# Patient Record
Sex: FEMALE | Race: OTHER | HISPANIC OR LATINO | URBAN - METROPOLITAN AREA
[De-identification: names, ages, dates, MRNs, and addresses within clinical notes are randomized per-mention and may not be internally consistent; named-entity substitution may affect disease eponyms.]

---

## 2023-07-09 ENCOUNTER — EMERGENCY (EMERGENCY)
Facility: HOSPITAL | Age: 28
LOS: 1 days | Discharge: HOME | End: 2023-07-09
Attending: EMERGENCY MEDICINE | Admitting: EMERGENCY MEDICINE
Payer: COMMERCIAL

## 2023-07-09 VITALS
TEMPERATURE: 98 F | HEIGHT: 66 IN | DIASTOLIC BLOOD PRESSURE: 91 MMHG | SYSTOLIC BLOOD PRESSURE: 149 MMHG | RESPIRATION RATE: 17 BRPM | HEART RATE: 93 BPM | OXYGEN SATURATION: 97 % | WEIGHT: 139.99 LBS

## 2023-07-09 VITALS
TEMPERATURE: 99 F | DIASTOLIC BLOOD PRESSURE: 90 MMHG | SYSTOLIC BLOOD PRESSURE: 146 MMHG | OXYGEN SATURATION: 98 % | HEART RATE: 90 BPM | RESPIRATION RATE: 18 BRPM

## 2023-07-09 DIAGNOSIS — J03.90 ACUTE TONSILLITIS, UNSPECIFIED: ICD-10-CM

## 2023-07-09 DIAGNOSIS — B95.5 UNSPECIFIED STREPTOCOCCUS AS THE CAUSE OF DISEASES CLASSIFIED ELSEWHERE: ICD-10-CM

## 2023-07-09 DIAGNOSIS — J02.9 ACUTE PHARYNGITIS, UNSPECIFIED: ICD-10-CM

## 2023-07-09 LAB
ANION GAP SERPL CALC-SCNC: 12 MMOL/L — SIGNIFICANT CHANGE UP (ref 5–17)
BASOPHILS # BLD AUTO: 0.03 K/UL — SIGNIFICANT CHANGE UP (ref 0–0.2)
BASOPHILS NFR BLD AUTO: 0.2 % — SIGNIFICANT CHANGE UP (ref 0–2)
BUN SERPL-MCNC: 8 MG/DL — SIGNIFICANT CHANGE UP (ref 7–23)
CALCIUM SERPL-MCNC: 8.7 MG/DL — SIGNIFICANT CHANGE UP (ref 8.4–10.5)
CHLORIDE SERPL-SCNC: 102 MMOL/L — SIGNIFICANT CHANGE UP (ref 96–108)
CO2 SERPL-SCNC: 24 MMOL/L — SIGNIFICANT CHANGE UP (ref 22–31)
CREAT SERPL-MCNC: 0.76 MG/DL — SIGNIFICANT CHANGE UP (ref 0.5–1.3)
EGFR: 109 ML/MIN/1.73M2 — SIGNIFICANT CHANGE UP
EOSINOPHIL # BLD AUTO: 0.06 K/UL — SIGNIFICANT CHANGE UP (ref 0–0.5)
EOSINOPHIL NFR BLD AUTO: 0.5 % — SIGNIFICANT CHANGE UP (ref 0–6)
GLUCOSE SERPL-MCNC: 84 MG/DL — SIGNIFICANT CHANGE UP (ref 70–99)
HCT VFR BLD CALC: 39.5 % — SIGNIFICANT CHANGE UP (ref 34.5–45)
HGB BLD-MCNC: 12.9 G/DL — SIGNIFICANT CHANGE UP (ref 11.5–15.5)
IMM GRANULOCYTES NFR BLD AUTO: 0.3 % — SIGNIFICANT CHANGE UP (ref 0–0.9)
LYMPHOCYTES # BLD AUTO: 1.06 K/UL — SIGNIFICANT CHANGE UP (ref 1–3.3)
LYMPHOCYTES # BLD AUTO: 8.3 % — LOW (ref 13–44)
MCHC RBC-ENTMCNC: 31.3 PG — SIGNIFICANT CHANGE UP (ref 27–34)
MCHC RBC-ENTMCNC: 32.7 GM/DL — SIGNIFICANT CHANGE UP (ref 32–36)
MCV RBC AUTO: 95.9 FL — SIGNIFICANT CHANGE UP (ref 80–100)
MONOCYTES # BLD AUTO: 0.95 K/UL — HIGH (ref 0–0.9)
MONOCYTES NFR BLD AUTO: 7.4 % — SIGNIFICANT CHANGE UP (ref 2–14)
NEUTROPHILS # BLD AUTO: 10.67 K/UL — HIGH (ref 1.8–7.4)
NEUTROPHILS NFR BLD AUTO: 83.3 % — HIGH (ref 43–77)
NRBC # BLD: 0 /100 WBCS — SIGNIFICANT CHANGE UP (ref 0–0)
PLATELET # BLD AUTO: 270 K/UL — SIGNIFICANT CHANGE UP (ref 150–400)
POTASSIUM SERPL-MCNC: 4 MMOL/L — SIGNIFICANT CHANGE UP (ref 3.5–5.3)
POTASSIUM SERPL-SCNC: 4 MMOL/L — SIGNIFICANT CHANGE UP (ref 3.5–5.3)
RBC # BLD: 4.12 M/UL — SIGNIFICANT CHANGE UP (ref 3.8–5.2)
RBC # FLD: 12.9 % — SIGNIFICANT CHANGE UP (ref 10.3–14.5)
SODIUM SERPL-SCNC: 138 MMOL/L — SIGNIFICANT CHANGE UP (ref 135–145)
WBC # BLD: 12.81 K/UL — HIGH (ref 3.8–10.5)
WBC # FLD AUTO: 12.81 K/UL — HIGH (ref 3.8–10.5)

## 2023-07-09 PROCEDURE — 99284 EMERGENCY DEPT VISIT MOD MDM: CPT

## 2023-07-09 PROCEDURE — 85025 COMPLETE CBC W/AUTO DIFF WBC: CPT

## 2023-07-09 PROCEDURE — 96365 THER/PROPH/DIAG IV INF INIT: CPT

## 2023-07-09 PROCEDURE — 96375 TX/PRO/DX INJ NEW DRUG ADDON: CPT

## 2023-07-09 PROCEDURE — 80048 BASIC METABOLIC PNL TOTAL CA: CPT

## 2023-07-09 PROCEDURE — 36415 COLL VENOUS BLD VENIPUNCTURE: CPT

## 2023-07-09 PROCEDURE — 99284 EMERGENCY DEPT VISIT MOD MDM: CPT | Mod: 25

## 2023-07-09 RX ORDER — IBUPROFEN 200 MG
1 TABLET ORAL
Qty: 21 | Refills: 0
Start: 2023-07-09 | End: 2023-07-15

## 2023-07-09 RX ORDER — KETOROLAC TROMETHAMINE 30 MG/ML
15 SYRINGE (ML) INJECTION ONCE
Refills: 0 | Status: DISCONTINUED | OUTPATIENT
Start: 2023-07-09 | End: 2023-07-09

## 2023-07-09 RX ORDER — AMPICILLIN SODIUM AND SULBACTAM SODIUM 250; 125 MG/ML; MG/ML
3 INJECTION, POWDER, FOR SUSPENSION INTRAMUSCULAR; INTRAVENOUS ONCE
Refills: 0 | Status: COMPLETED | OUTPATIENT
Start: 2023-07-09 | End: 2023-07-09

## 2023-07-09 RX ORDER — TETRACAINE/BENZOCAINE/BUTAMBEN 2%-14%-2%
1 OINTMENT (GRAM) TOPICAL ONCE
Refills: 0 | Status: DISCONTINUED | OUTPATIENT
Start: 2023-07-09 | End: 2023-07-13

## 2023-07-09 RX ORDER — DEXAMETHASONE 0.5 MG/5ML
10 ELIXIR ORAL ONCE
Refills: 0 | Status: COMPLETED | OUTPATIENT
Start: 2023-07-09 | End: 2023-07-09

## 2023-07-09 RX ADMIN — AMPICILLIN SODIUM AND SULBACTAM SODIUM 200 GRAM(S): 250; 125 INJECTION, POWDER, FOR SUSPENSION INTRAMUSCULAR; INTRAVENOUS at 13:36

## 2023-07-09 RX ADMIN — Medication 102 MILLIGRAM(S): at 13:36

## 2023-07-09 RX ADMIN — Medication 15 MILLIGRAM(S): at 13:59

## 2023-07-09 RX ADMIN — Medication 10 MILLIGRAM(S): at 14:28

## 2023-07-09 NOTE — ED ADULT TRIAGE NOTE - CHIEF COMPLAINT QUOTE
Pt sent by  for peritonsilar abscess, + strep x Friday. Pt started zpak friday without relief. Denies fevers, sob. No pmh

## 2023-07-09 NOTE — CONSULT NOTE ADULT - SUBJECTIVE AND OBJECTIVE BOX
OTOLARYNGOLOGY (ENT) CONSULTATION NOTE    PATIENT: CAM COTE     MRN: [id number]       : 89  DATE OF ADMISSION:23  DATE OF SERVICE:  23 @ 15:16    CHIEF COMPLAINT: throat pain     HISTORY OF PRESENT ILLNESS:  CAM COTE  is a 28y Female with no significant PMHx who presents with complaints of 4 days of throat pain, bilateral, right worse than left. Has been able to tolerate oral intake. Notices snoring that started at night when pain started. Saw a provider who started z-pack that she has taken for 2 days without improvement. Went back to urgent care today and rapid strep test performed that was positive; transferred to [facility] for possible PTA.     Has history of strep infections as child but not as adult. Denies any fevers. Has been taking ibuprofen for pain. No significant voice change or drooling.     PAST MEDICAL HISTORY:    CURRENT MEDICATIONS   tetracaine/benzocaine/butamben Spray 1 Topical PRN      HOME MEDICATIONS:      ALLERGIES:  No Known Allergies    SOCIAL HISTORY: Pertinent included in HPI   FAMILY HISTORY      SURGICAL HISTORY:      REVIEW OF SYSTEMS: The patient was asked and responded to a review of systems regarding the following symptoms: constitutional, eye, ears, nose, mouth, throat, cardiovascular, respiratory. Pertinent factors have been included in the HPI.     PHYSICAL EXAMINATION:  General: NAD, A+Ox3  Respiratory: No respiratory distress, stridor, or stertor  Voice quality: normal  Face:  Symmetric without masses or lesions  OU: EOMI  AD: Pinna wnl  AS: Pinna wnl  Nose: nasal cavity clear bilaterally, inferior turbinates normal, mucosa normal without crusting or bleeding  OC/OP: tongue normal, floor of mouth WNL, no masses or lesions. Bilateral 3+ tonsillar swelling with erythema. No significant peritonsillar or soft palate swelling. No palpable fluctuance.   Neck: soft/flat, no LAD  Neuro: CNII-XII intact    Vital Signs:  T(C): 36.6 (23 @ 12:11), Max: 36.6 (23 @ 12:11)  HR: 93 (23 @ 12:11) (93 - 93)  BP: 149/91 (23 @ 12:11) (149/91 - 149/91)  RR: 17 (23 @ 12:11) (17 - 17)  SpO2: 97% (23 @ 12:11) (97% - 97%)                        12.9   12.81 )-----------( 270      ( 2023 14:09 )             39.5        138  |  102  |  8   ----------------------------<  84  4.0   |  24  |  0.76    Ca    8.7      2023 14:09            -

## 2023-07-09 NOTE — ED ADULT NURSE NOTE - CAS ELECT INFOMATION PROVIDED
Advise to f/u with PCP. Medication instructions provided, advise to complete ABT therapy. Return to ed for any concerning/worsening symptoms./DC instructions

## 2023-07-09 NOTE — ED ADULT NURSE REASSESSMENT NOTE - NS ED NURSE REASSESS COMMENT FT1
Pt states pain partially relieved by toradol. Able to swallow better but still in pain. Denies need for more pain meds at the moment. Pt AAOx4, speaking in full and clear sentences.

## 2023-07-09 NOTE — ED PROVIDER NOTE - PROGRESS NOTE DETAILS
pt seen by ENT who is not concerned for PTA at this time, recommend augmentin for tonsillitis, return precautions for worsening symptoms

## 2023-07-09 NOTE — ED PROVIDER NOTE - NSFOLLOWUPINSTRUCTIONS_ED_ALL_ED_FT
Tonsillitis  An open mouth with the tongue lifted showing the tonsils.  Tonsillitis is an infection of the throat. Tonsils are tissues in the back of your throat. This infection causes the tonsils to become red, tender, and swollen.    What are the causes?  Tonsillitis is caused by germs (bacteria or a virus). This condition can also occur when pieces of food and bacteria build up around the tonsils.    Tonsillitis that is caused by germs can spread from person to person.    What are the signs or symptoms?  A sore throat.  Trouble swallowing.  White patches on the tonsils.  Swollen tonsils.  Fever.  Headache.  Tiredness.  Not feeling hungry.  Snoring during sleep when you did not snore before.  Foul-smelling, yellowish-white pieces of material that you cough up or spit out. These can cause bad breath.  How is this treated?  Medicines. These can be given to treat pain, swelling, or fever. They can also be given to kill bacteria.  Surgery to take out the tonsils. This is done if you have very bad infections that do not go away.  Follow these instructions at home:  Medicines    Take over-the-counter and prescription medicines only as told by your doctor.  If you were prescribed an antibiotic medicine, take it as told by your doctor. Do not stop taking the antibiotic even if you start to feel better.  Eating and drinking    Drink enough fluid to keep your pee (urine) pale yellow.  While your throat is sore, eat soft or liquid foods, such as:  Soup.  Sherbet.  Soft, warm cereals, such as oatmeal or hot wheat cereal.  Drink warm fluids.  Eat frozen ice pops.  General instructions    Rest as much as you can, and get plenty of sleep.  Rinse your mouth often with salt water.  To make salt water, dissolve ½–1 tsp (3–6 g) of salt in 1 cup (237 mL) of warm water.  Do not swallow the salt water.  Wash your hands often with soap and water for at least 20 seconds. If there is no soap and water, use hand .  Do not share cups, bottles, or other utensils until your symptoms are gone.  Do not smoke or use any products that contain nicotine or tobacco. If you need help quitting, ask your doctor.  Keep all follow-up visits.  Contact a doctor if:  You have large, tender lumps in your neck that are new.  You have a fever that does not go away after 2–3 days.  You have a rash.  You cough up green, yellow-brown, or bloody fluid.  You cannot swallow liquids or food for 24 hours.  Only one of your tonsils is swollen.  Get help right away if:  You have any new symptoms such as:  Vomiting.  Very bad headache.  Stiff neck.  Chest pain.  Trouble breathing or swallowing.  You have very bad throat pain, and you also have drooling or voice changes.  You have very bad pain that is not helped by medicine.  You cannot fully open your mouth.  You have redness, swelling, or very bad pain anywhere in your neck.  Summary  Tonsillitis is an infection of the throat. It causes your tonsils to be red, tender, and swollen.  While your throat is sore, eat soft or liquid foods.  Rinse your mouth often with salt water.  Do not share cups, bottles, or other utensils until your symptoms are gone.  This information is not intended to replace advice given to you by your health care provider. Make sure you discuss any questions you have with your health care provider.

## 2023-07-09 NOTE — ED PROVIDER NOTE - ATTENDING APP SHARED VISIT CONTRIBUTION OF CARE
27 yo with R tonsillar pain, diff swallowing, airway intact, b/l tonsillitis vs R small PTA, non toxic appearing, +strep positive, will give steroids/ivf/pain meds, abx for strep, ENT consult for possible I&D PTA. return prec given.

## 2023-07-09 NOTE — ED PROVIDER NOTE - OBJECTIVE STATEMENT
29 y/o f presents c/o worsening sore throat x 4 days.  Pt stating she was seen for this 3 days ago, put on a zpack which hasn't been helping, went back to urgent care today with worsening symptoms and was sent to ED with concern for PTA.  Pt is able to swallow but with pain.  Denies fever, chills, all other ROS negative.

## 2023-07-09 NOTE — ED PROVIDER NOTE - PATIENT PORTAL LINK FT
You can access the FollowMyHealth Patient Portal offered by [facility] by registering at the following website: [email or web address]. By joining [employer]’s FollowMyHealth portal, you will also be able to view your health information using other applications (apps) compatible with our system.

## 2023-07-09 NOTE — CONSULT NOTE ADULT - ASSESSMENT
ASSESSMENT/PLAN:    IMPRESSION: CAM COTE  is a 28y Female with 4 days of sore throat, strep+ at urgent care. ENT consulted to evaluate for possible PTA. Exam consistent with tonsillitis with bilateral tonsil swelling, no palpable soft palate/peritonsillar fluctuance. Treated with Z-pack for 2 days. Pt reports feels improved after receiving decadron, 1 dose Unasyn in ED.      RECOMMENDATIONS:  - Recommend broadening antibiotics to Augmentin for 10 days in setting of failed azithromycin treatment   - Strict return precautions discussed with patient including any worsening throat pain, difficulty swallowing, high fevers, difficulty breathing at home- she should come back to ED for reevaluation  - Follow up in 7 days with Dr. Pereira to assess treatment progression    Thank you for the kind referral and for allowing me to share in the care of CAM COTE If you have any questions, please do not hesitate to contact me.     Sincerely,  Nestor Ramon MD PGY2  07-09-23 @ 15:16

## 2023-07-09 NOTE — ED ADULT NURSE NOTE - OBJECTIVE STATEMENT
28y F c/o throat pain. Pt states she was dx with strep 28y F c/o throat pain. Pt states she had symptoms of swollen tonsils last wednesday, seen by an NP who gave her antibiotics for possible tonsillitis. Endorses worsening 10/10 pain, difficulty swallowing, decreased PO. strep + at outside UC this morning, also dx with peritonsillar abscess. Denies fever/chills, headache, N/V, drainage. Pt AAOx4, speaking in full and clear sentences, in no apparent distress at this time.

## 2023-07-09 NOTE — ED ADULT NURSE NOTE - NSFALLUNIVINTERV_ED_ALL_ED
Bed/Stretcher in lowest position, wheels locked, appropriate side rails in place/Call bell, personal items and telephone in reach/Instruct patient to call for assistance before getting out of bed/chair/stretcher/Non-slip footwear applied when patient is off stretcher/[place] to call system/Physically safe environment - no spills, clutter or unnecessary equipment/Purposeful proactive rounding/Room/bathroom lighting operational, light cord in reach

## 2023-07-09 NOTE — ED PROVIDER NOTE - CLINICAL SUMMARY MEDICAL DECISION MAKING FREE TEXT BOX
27 y/o f presents c/o worsening sore throat, exam concerning for possible PTA.  Pt afebrile in ED, tolerating her own secretions, labs sent, given decadron, unasyn and toradol.  ENT consulted, will f/u recs

## 2023-07-09 NOTE — ED PROVIDER NOTE - ENMT, MLM
Airway patent, pharynx mucosa erythematous 2+ tonsil edema b/l, +right peritonsillar fullness with uvula deviation to left side

## 2023-07-09 NOTE — ED PROVIDER NOTE - NS ED ATTENDING STATEMENT MOD
This was a shared visit with the MALLY. I reviewed and verified the documentation and independently performed the documented: